# Patient Record
Sex: MALE | Race: WHITE | NOT HISPANIC OR LATINO
[De-identification: names, ages, dates, MRNs, and addresses within clinical notes are randomized per-mention and may not be internally consistent; named-entity substitution may affect disease eponyms.]

---

## 2021-04-02 ENCOUNTER — TRANSCRIPTION ENCOUNTER (OUTPATIENT)
Age: 56
End: 2021-04-02

## 2021-10-02 ENCOUNTER — EMERGENCY (EMERGENCY)
Facility: HOSPITAL | Age: 56
LOS: 1 days | Discharge: HOME | End: 2021-10-02
Admitting: EMERGENCY MEDICINE
Payer: COMMERCIAL

## 2021-10-02 VITALS
DIASTOLIC BLOOD PRESSURE: 82 MMHG | HEIGHT: 72 IN | WEIGHT: 212.08 LBS | RESPIRATION RATE: 18 BRPM | OXYGEN SATURATION: 98 % | TEMPERATURE: 98 F | HEART RATE: 62 BPM | SYSTOLIC BLOOD PRESSURE: 121 MMHG

## 2021-10-02 DIAGNOSIS — S61.211A LACERATION WITHOUT FOREIGN BODY OF LEFT INDEX FINGER WITHOUT DAMAGE TO NAIL, INITIAL ENCOUNTER: ICD-10-CM
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
## 2021-10-02 DIAGNOSIS — Z23 ENCOUNTER FOR IMMUNIZATION: ICD-10-CM
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
## 2021-10-02 DIAGNOSIS — Z86.79 PERSONAL HISTORY OF OTHER DISEASES OF THE CIRCULATORY SYSTEM: ICD-10-CM

## 2021-10-02 PROCEDURE — 12001 RPR S/N/AX/GEN/TRNK 2.5CM/<: CPT

## 2021-10-02 PROCEDURE — 99283 EMERGENCY DEPT VISIT LOW MDM: CPT | Mod: 25

## 2021-10-02 PROCEDURE — 90471 IMMUNIZATION ADMIN: CPT

## 2021-10-02 PROCEDURE — 90715 TDAP VACCINE 7 YRS/> IM: CPT

## 2021-10-02 RX ORDER — ACETAMINOPHEN 500 MG
1000 TABLET ORAL ONCE
Refills: 0 | Status: DISCONTINUED | OUTPATIENT
Start: 2021-10-02 | End: 2021-10-06

## 2021-10-02 RX ORDER — TETANUS TOXOID, REDUCED DIPHTHERIA TOXOID AND ACELLULAR PERTUSSIS VACCINE, ADSORBED 5; 2.5; 8; 8; 2.5 [IU]/.5ML; [IU]/.5ML; UG/.5ML; UG/.5ML; UG/.5ML
0.5 SUSPENSION INTRAMUSCULAR ONCE
Refills: 0 | Status: COMPLETED | OUTPATIENT
Start: 2021-10-02 | End: 2021-10-02

## 2021-10-02 RX ADMIN — TETANUS TOXOID, REDUCED DIPHTHERIA TOXOID AND ACELLULAR PERTUSSIS VACCINE, ADSORBED 0.5 MILLILITER(S): 5; 2.5; 8; 8; 2.5 SUSPENSION INTRAMUSCULAR at 19:24

## 2021-10-02 NOTE — ED PROVIDER NOTE - PHYSICAL EXAMINATION
CONSTITUTIONAL: Well-appearing;  in no apparent distress.   HEAD: Normocephalic; atraumatic.   CARDIOVASCULAR: rrr, no audible murmurs   RESPIRATORY: Breathing easily;   SKIN: L index finger- 1cm linear lac to distal phalanx volar aspect, NV intact, tendons intact

## 2021-10-02 NOTE — ED PROVIDER NOTE - OBJECTIVE STATEMENT
54 yo M here with lac to L index finger fro a bread knife. Denies numbness, tingling. Last tetanus unknown. Pt is RHD.

## 2021-10-02 NOTE — ED PROVIDER NOTE - NSFOLLOWUPINSTRUCTIONS_ED_ALL_ED_FT
Please keep wound clean and dry for 24 hours. Return in 10 days for suture removal. Return to ED sooner for worsening fever, chills, swelling, redness, warmth, drainage or any other concerning symptoms.    Laceration    A laceration is a cut that goes through all of the layers of the skin and into the tissue that is right under the skin. Some lacerations heal on their own. Others need to be closed with skin adhesive strips, skin glue, stitches (sutures), or staples. Proper laceration care minimizes the risk of infection and helps the laceration to heal better.  If non-absorbable stitches or staples have been placed, they must be taken out within the time frame instructed by your healthcare provider.    SEEK IMMEDIATE MEDICAL CARE IF YOU HAVE ANY OF THE FOLLOWING SYMPTOMS: swelling around the wound, worsening pain, drainage from the wound, red streaking going away from your wound, inability to move finger or toe near the laceration, or discoloration of skin near the laceration.

## 2021-10-02 NOTE — ED ADULT NURSE NOTE - OBJECTIVE STATEMENT
Pt presents reporting lac to left second finger. Cut on a bread knife at home. ROM intact. Last tdap unknown.

## 2021-10-02 NOTE — ED ADULT TRIAGE NOTE - CHIEF COMPLAINT QUOTE
Pt CO Lac to Left 2nd Finger.  states "I just cut it at home and came straight here."  last TDAP unknown.

## 2021-10-02 NOTE — ED PROVIDER NOTE - PATIENT PORTAL LINK FT
You can access the FollowMyHealth Patient Portal offered by [facility] by registering at the following website: [email or web address]. By joining [employer]’s FollowMyHealth portal, you will also be able to view your health information using other applications (apps) compatible with our system.

## 2021-10-02 NOTE — ED ADULT NURSE NOTE - NSIMPLEMENTINTERV_GEN_ALL_ED
Implemented All Universal Safety Interventions:  [place] to call system. Call bell, personal items and telephone within reach. Instruct patient to call for assistance. Room bathroom lighting operational. Non-slip footwear when patient is off stretcher. Physically safe environment: no spills, clutter or unnecessary equipment. Stretcher in lowest position, wheels locked, appropriate side rails in place.

## 2021-10-11 ENCOUNTER — EMERGENCY (EMERGENCY)
Facility: HOSPITAL | Age: 56
LOS: 1 days | Discharge: HOME | End: 2021-10-11
Attending: EMERGENCY MEDICINE | Admitting: EMERGENCY MEDICINE
Payer: COMMERCIAL

## 2021-10-11 VITALS
WEIGHT: 212.08 LBS | TEMPERATURE: 98 F | OXYGEN SATURATION: 97 % | RESPIRATION RATE: 18 BRPM | HEIGHT: 72 IN | SYSTOLIC BLOOD PRESSURE: 103 MMHG | HEART RATE: 66 BPM | DIASTOLIC BLOOD PRESSURE: 66 MMHG
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
## 2021-10-11 DIAGNOSIS — Z48.02 ENCOUNTER FOR REMOVAL OF SUTURES: ICD-10-CM
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
## 2021-10-11 DIAGNOSIS — S61.211D LACERATION WITHOUT FOREIGN BODY OF LEFT INDEX FINGER WITHOUT DAMAGE TO NAIL, SUBSEQUENT ENCOUNTER: ICD-10-CM

## 2021-10-11 PROCEDURE — L9995: CPT

## 2021-10-11 PROCEDURE — 99212 OFFICE O/P EST SF 10 MIN: CPT

## 2021-10-11 NOTE — ED ADULT NURSE NOTE - OBJECTIVE STATEMENT
56yo male arrives for suture removal as instructed. Sutures placed on 10/02/21 in ED to L second finger, no redness/drainage/edema noted. Denies pain.

## 2021-10-11 NOTE — ED PROVIDER NOTE - PATIENT PORTAL LINK FT
You can access the FollowMyHealth Patient Portal offered by [facility] by registering at the following website: [email or web address]. By joining [employer]’s FollowMyHealth portal, you will also be able to view your health information using other applications (apps) compatible with our system.

## 2021-10-11 NOTE — ED PROVIDER NOTE - OBJECTIVE STATEMENT
Visit for suture removal - 3 sutures L2 finger lac, 9 days ago.  No pain, redness, discharge or fever.

## 2022-10-06 NOTE — ED PROVIDER NOTE - CLINICAL SUMMARY MEDICAL DECISION MAKING FREE TEXT BOX
54 yo M here with lac to L index finger fro a bread knife. Denies numbness, tingling. Last tetanus unknown. Pt is RHD. L index finger- 1cm linear lac to distal phalanx volar aspect, NV intact, tendons intact. Lac repaired and tetanus updated. Complex Repair Preamble Text (Leave Blank If You Do Not Want): Extensive wide undermining was performed.

## 2023-01-01 NOTE — ED PROCEDURE NOTE - EBL
Progress Note    Charly Hastings is a 42-hour old male 7 lb 5.5 oz (3330 g) infant, delivered at Gestational Age: 40w0d on 2023.  Baby not as interested in latching since overnoc and mom continues to attempt.      Birth Measurements:    Weight: 7 lb 5.5 oz (3330 g)  Length: 20\"  Head circumference: 34.5 cm     Labs:    TCB Result: 6.6 (23)  TCB Site: Head   Hours of Age at time of TCB: Hours of age-Transcutaneous Biliribin: 24 Hrs    Screenings/ Immunizations:    [place]  screen: done, results pending    Congenital heart disease (CHD) screening complete: Done (23)   Right hand reading %: 98 %   Foot reading %: 96 %   CHD: Normal    Hearing exam: [place] Hearing Test Machine: Auditory Brainstem Response (Algo) (23)   Hearing Test Results: Pass R, Pass L (23)    Immunizations:   Most Recent Immunizations   Administered Date(s) Administered   • Hep B, adolescent or pediatric 2023       VITALS:   Visit Vitals  Pulse 138   Temp 98.6 °F (37 °C) (Axillary)   Resp 44   Ht 20\" (50.8 cm) Comment: Filed from Delivery Summary   Wt 3180 g   HC 34.5 cm (13.58\") Comment: Filed from Delivery Summary   BMI 12.32 kg/m²        Change in Weight -4.5 % (23 0216)     Intake/Output        0700   0659  0700   0659    Urine      Stool      Total Output      Net            Urine Occurrence 3 x     Stool Occurrence 4 x     Unmeasured Breast Milk Occurrence 9 x 1 x          PHYSICAL EXAM     GENERAL:Baby Boy is vigorous male with appropriate behavior was fussing with diaper change and settled with mom and pacifier with exam falling asleep during exam.  He is in no acute distress.  SKIN: His skin is warm with normal turgor. The skin is pink and slightly jaundiced.  Some ET and mild irritation of skin on back but no breakdown.  There are no bruises or other signs of injury.   HEAD: The head is atraumatic and normocephalic. The anterior  fontanel is open and flat.  EYES:  bilateral RR present without d/c and anicteric sclera   EARS: Pinnae normal.  NOSE: There is no nasal flaring, nares patent bilaterally.  MOUTH:  MMM  TRUNK AND THORAX: There are no retractions.  LUNGS: The lung fields are clear to auscultation.  HEART: The precordium is quiet. The heart rhythm is grossly regular. S1 and S2 are normal. There are no murmurs. Normal femoral pulses.  ABDOMEN: The umbilical cord stump is normal. No hepato-splenomegaly/masses. The abdomen is flat and soft.   GENITALIA: normal male genitalia with bilateral descended testes and s/p circumcision petroleum jelly present and healing normally.  RECTAL: anus patent  EXTREMITIES: Moving all 4 extremities.  Hips with full and symmetric ROM/creases without clicks/clunks.   NEUROLOGIC: He displays normal tone throughout. He is not jittery.      ASSESSMENT & PLAN     Well 2 day old male infant.    Patient Active Problem List   Diagnosis   • Term  delivered by  section, current hospitalization   • Congenital dermal melanocytosis   • Meconium in amniotic fluid - light     Jaundice:  Assessment using AAP 2022 guidelines.     Infant's risk factor(s): none    Per [email or web address]:     Based on transcutaneous bilirubin of 6.6 at 24 hours of life Tx threshold per NEW guidelines:  13.3      Plan:  Lab/follow-up recommendations per guideline based on Phototherapy threshold minus bilirubin measurement:  Recheck within 2 days    Will recheck TCB tomorrow am for d/c and f/u planning.     SKIN:  Gentle skin care rev'd with perfume/dye free products and avoidance of rubbing of skin for cleansing but rather rinse/pat dry to decrease irritation in general.      Feeding: [place] is currently being fed Breast milk only.  Encouraged nursing from both breasts/feeding now that > 24 HOL to reduce risk of further weight loss - suggested mom contact nursing/Lactation RN to try to nurse again soon.  She plans to limit pacifier  use in general.     I am aware that mother's feeding choice upon admission was the following:   Information for the patient's mother:  Hilda Hastings \"Ondina\" [[id number]]   Exclusive breast milk feeding     Procedures: Circumcision    Consults: Lactation     Disp:  Mom plans for d/c to home tomorrow and will find pediatrician accepting pts in their area.      Signed by: Nilo Best MD   2023   minimal

## 2023-05-02 NOTE — ED ADULT NURSE NOTE - CINV DISCH TEACH PARTICIP
Patient Island Pedicle Flap With Canthal Suspension Text: The defect edges were debeveled with a #15 scalpel blade.  Given the location of the defect, shape of the defect and the proximity to free margins an island pedicle advancement flap was deemed most appropriate.  Using a sterile surgical marker, an appropriate advancement flap was drawn incorporating the defect, outlining the appropriate donor tissue and placing the expected incisions within the relaxed skin tension lines where possible. The area thus outlined was incised deep to adipose tissue with a #15 scalpel blade.  The skin margins were undermined to an appropriate distance in all directions around the primary defect and laterally outward around the island pedicle utilizing iris scissors.  There was minimal undermining beneath the pedicle flap. A suspension suture was placed in the canthal tendon to prevent tension and prevent ectropion.

## 2024-04-30 NOTE — ED ADULT NURSE NOTE - NS ED NOTE  TALK SOMEONE YN
What Type Of Note Output Would You Prefer (Optional)?: Bullet Format How Severe Is Your Skin Lesion?: mild Has Your Skin Lesion Been Treated?: not been treated Is This A New Presentation, Or A Follow-Up?: Skin Lesions No
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
## 2025-02-22 ENCOUNTER — EMERGENCY (EMERGENCY)
Facility: HOSPITAL | Age: 60
LOS: 1 days | Discharge: HOME | End: 2025-02-22
Admitting: EMERGENCY MEDICINE
Payer: COMMERCIAL

## 2025-02-22 VITALS
DIASTOLIC BLOOD PRESSURE: 71 MMHG | WEIGHT: 199.96 LBS | HEART RATE: 80 BPM | RESPIRATION RATE: 16 BRPM | TEMPERATURE: 97 F | SYSTOLIC BLOOD PRESSURE: 113 MMHG | OXYGEN SATURATION: 100 %

## 2025-02-22 PROCEDURE — 90715 TDAP VACCINE 7 YRS/> IM: CPT

## 2025-02-22 PROCEDURE — 99283 EMERGENCY DEPT VISIT LOW MDM: CPT | Mod: 25

## 2025-02-22 PROCEDURE — 99284 EMERGENCY DEPT VISIT MOD MDM: CPT

## 2025-02-22 PROCEDURE — 90471 IMMUNIZATION ADMIN: CPT

## 2025-02-22 RX ORDER — CLOSTRIDIUM TETANI TOXOID ANTIGEN (FORMALDEHYDE INACTIVATED), CORYNEBACTERIUM DIPHTHERIAE TOXOID ANTIGEN (FORMALDEHYDE INACTIVATED), BORDETELLA PERTUSSIS TOXOID ANTIGEN (GLUTARALDEHYDE INACTIVATED), BORDETELLA PERTUSSIS FILAMENTOUS HEMAGGLUTININ ANTIGEN (FORMALDEHYDE INACTIVATED), BORDETELLA PERTUSSIS PERTACTIN ANTIGEN, AND BORDETELLA PERTUSSIS FIMBRIAE 2/3 ANTIGEN 5; 2; 2.5; 5; 3; 5 [LF]/.5ML; [LF]/.5ML; UG/.5ML; UG/.5ML; UG/.5ML; UG/.5ML
0.5 INJECTION, SUSPENSION INTRAMUSCULAR ONCE
Refills: 0 | Status: COMPLETED | OUTPATIENT
Start: 2025-02-22 | End: 2025-02-22

## 2025-02-22 RX ORDER — IBUPROFEN 600 MG/1
600 TABLET, FILM COATED ORAL ONCE
Refills: 0 | Status: COMPLETED | OUTPATIENT
Start: 2025-02-22 | End: 2025-02-22

## 2025-02-22 RX ADMIN — IBUPROFEN 600 MILLIGRAM(S): 600 TABLET, FILM COATED ORAL at 19:02

## 2025-02-22 RX ADMIN — CLOSTRIDIUM TETANI TOXOID ANTIGEN (FORMALDEHYDE INACTIVATED), CORYNEBACTERIUM DIPHTHERIAE TOXOID ANTIGEN (FORMALDEHYDE INACTIVATED), BORDETELLA PERTUSSIS TOXOID ANTIGEN (GLUTARALDEHYDE INACTIVATED), BORDETELLA PERTUSSIS FILAMENTOUS HEMAGGLUTININ ANTIGEN (FORMALDEHYDE INACTIVATED), BORDETELLA PERTUSSIS PERTACTIN ANTIGEN, AND BORDETELLA PERTUSSIS FIMBRIAE 2/3 ANTIGEN 0.5 MILLILITER(S): 5; 2; 2.5; 5; 3; 5 INJECTION, SUSPENSION INTRAMUSCULAR at 19:01

## 2025-02-22 NOTE — ED PROVIDER NOTE - CLINICAL SUMMARY MEDICAL DECISION MAKING FREE TEXT BOX
58 yo M with no pmh, RHD c/o laceration to L thumb with a knife while cutting an avocado. Denies numbness, tingling. L thumb- superficial lac through distal nail, nail in place, no active bleeding, NV intact. As bleeding is well controlled  will not remove nail, wound cleaned, bacitracin and dressing applied, no indication for sutures

## 2025-02-22 NOTE — ED ADULT NURSE NOTE - OBJECTIVE STATEMENT
59yoM came to ED c/o laceration to L thumb s/p cutting finger with a knife 15  minutes ago. Bleeding controlled with pressure and paper towel. Pt denies numbness/ tingling. Unknown last TDAP, pt denies taking pain medication  prior to coming.

## 2025-02-22 NOTE — ED PROVIDER NOTE - PHYSICAL EXAMINATION
CONSTITUTIONAL: Well-appearing;  in no apparent distress.   HEAD: Normocephalic; atraumatic.   EYES: PERRL; EOM intact; conjunctiva and sclera clear  SKIN: L thumb- superficial lac through distal nail, nail in place, no active bleeding, NV intact

## 2025-02-22 NOTE — ED PROVIDER NOTE - OBJECTIVE STATEMENT
60 yo M with no pmh, RHD c/o laceration to L thumb with a knife while cutting an avocado. Denies numbness, tingling. Unknown last tetanus

## 2025-02-22 NOTE — ED PROVIDER NOTE - PATIENT PORTAL LINK FT
You can access the FollowMyHealth Patient Portal offered by [facility] by registering at the following website: [email or web address]. By joining "[employer]’s FollowMyHealth portal, you will also be able to view your health information using other applications (apps) compatible with our system.

## 2025-02-24 DIAGNOSIS — S61.012A LACERATION WITHOUT FOREIGN BODY OF LEFT THUMB WITHOUT DAMAGE TO NAIL, INITIAL ENCOUNTER: ICD-10-CM
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
## 2025-02-24 DIAGNOSIS — Z23 ENCOUNTER FOR IMMUNIZATION: ICD-10-CM
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
## 2025-03-17 NOTE — ED ADULT NURSE NOTE - NS ED NOTE  TALK SOMEONE YN
Spoke with patient,  Discussed concerns  She is concerned about her hypothyroid symptoms  I informed her due to her underlying history of cardiac disease, I would advise her to finish her cardiac workup before starting levothyroxine, as her labs show borderline hypothyroidism, overtreatment can worsen the heart disease  She understands the risk, and would like to start the levothyroxine    Will start low-dose 12.5 mcg of levothyroxine a day  Recheck labs in 4 weeks  Discussed symptoms of hyperthyroidism-if any concerning symptoms of hyperthyroidism or angina, I advised her to contact cardiology and inform me  
No

## 2025-04-08 NOTE — ED PROVIDER NOTE - NS ED ATTENDING STATEMENT MOD
Pt is a 2y5m w/o PMHx who presents to the ED from home w/ parents s/p trip and fall. Around 8 AM the patient fell from standing height and hit her head on a glass table. The patient did not LOC or vomit, cried immediately after the incident. Parents report that the patient is acting at baseline.
Attending Only